# Patient Record
Sex: MALE | Race: WHITE | NOT HISPANIC OR LATINO | Employment: FULL TIME | URBAN - METROPOLITAN AREA
[De-identification: names, ages, dates, MRNs, and addresses within clinical notes are randomized per-mention and may not be internally consistent; named-entity substitution may affect disease eponyms.]

---

## 2019-03-29 ENCOUNTER — HOSPITAL ENCOUNTER (EMERGENCY)
Facility: MEDICAL CENTER | Age: 49
End: 2019-03-29
Attending: EMERGENCY MEDICINE
Payer: COMMERCIAL

## 2019-03-29 VITALS
SYSTOLIC BLOOD PRESSURE: 125 MMHG | TEMPERATURE: 97.7 F | BODY MASS INDEX: 29.95 KG/M2 | OXYGEN SATURATION: 97 % | DIASTOLIC BLOOD PRESSURE: 85 MMHG | WEIGHT: 246.03 LBS | RESPIRATION RATE: 18 BRPM | HEART RATE: 69 BPM

## 2019-03-29 DIAGNOSIS — S01.01XA LACERATION OF SCALP, INITIAL ENCOUNTER: ICD-10-CM

## 2019-03-29 PROCEDURE — 90715 TDAP VACCINE 7 YRS/> IM: CPT | Performed by: EMERGENCY MEDICINE

## 2019-03-29 PROCEDURE — 304999 HCHG REPAIR-SIMPLE/INTERMED LEVEL 1

## 2019-03-29 PROCEDURE — 304217 HCHG IRRIGATION SYSTEM

## 2019-03-29 PROCEDURE — 700101 HCHG RX REV CODE 250: Performed by: EMERGENCY MEDICINE

## 2019-03-29 PROCEDURE — 700111 HCHG RX REV CODE 636 W/ 250 OVERRIDE (IP): Performed by: EMERGENCY MEDICINE

## 2019-03-29 PROCEDURE — 303747 HCHG EXTRA SUTURE

## 2019-03-29 PROCEDURE — 99283 EMERGENCY DEPT VISIT LOW MDM: CPT

## 2019-03-29 PROCEDURE — 90471 IMMUNIZATION ADMIN: CPT

## 2019-03-29 RX ORDER — LIDOCAINE HYDROCHLORIDE 10 MG/ML
20 INJECTION, SOLUTION INFILTRATION; PERINEURAL ONCE
Status: COMPLETED | OUTPATIENT
Start: 2019-03-29 | End: 2019-03-29

## 2019-03-29 RX ADMIN — CLOSTRIDIUM TETANI TOXOID ANTIGEN (FORMALDEHYDE INACTIVATED), CORYNEBACTERIUM DIPHTHERIAE TOXOID ANTIGEN (FORMALDEHYDE INACTIVATED), BORDETELLA PERTUSSIS TOXOID ANTIGEN (GLUTARALDEHYDE INACTIVATED), BORDETELLA PERTUSSIS FILAMENTOUS HEMAGGLUTININ ANTIGEN (FORMALDEHYDE INACTIVATED), BORDETELLA PERTUSSIS PERTACTIN ANTIGEN, AND BORDETELLA PERTUSSIS FIMBRIAE 2/3 ANTIGEN 0.5 ML: 5; 2; 2.5; 5; 3; 5 INJECTION, SUSPENSION INTRAMUSCULAR at 17:02

## 2019-03-29 RX ADMIN — LIDOCAINE HYDROCHLORIDE 20 ML: 10 INJECTION, SOLUTION INFILTRATION; PERINEURAL at 16:48

## 2019-03-29 NOTE — LETTER
FORM C-4:  EMPLOYEE’S CLAIM FOR COMPENSATION/ REPORT OF INITIAL TREATMENT  EMPLOYEE’S CLAIM - PROVIDE ALL INFORMATION REQUESTED   First Name  Pj Last Name  Dedrick Birthdate             Age  1970 48 y.o. Sex  male Claim Number   Home Employee Address  [address] [place] [address]                                     Zip  [address] Height    Weight  111.6 kg (246 lb 0.5 oz) [facility]  [id number]   Mailing Employee Address                           [address] [place] [address]               Zip  [address] Telephone  [phone] (home)  Primary Language Spoken  ENGLISH   Insurer   Third Party   [employer] INSURANCE Employee's Occupation (Job Title) When Injury or Occupational Disease Occurred     Employer's Name   Telephone  [phone]    Employer Address  [address]] Zip  [address]   Date of Injury         Hour of Injury  2:00 PM Date Employer Notified  3/29/2019 Last Day of Work after Injury or Occupational Disease  3/29/2019 Supervisor to Whom Injury Reported  [place]   Address or Location of Accident (if applicable)  [[address] [place]]   What were you doing at the time of accident? (if applicable)  Standing Up    How did this injury or occupational disease occur? Be specific and answer in detail. Use additional sheet if necessary)  Was in the process of standing up and hit my head on the door to [employer] tank # 3   If you believe that you have an occupational disease, when did you first have knowledge of the disability and it relationship to your employment?  N/a Witnesses to the Accident  Morty H     Nature of Injury or Occupational Disease  Workers' Compensation  Part(s) of Body Injured or Affected  Skull, N/A, N/A    I certify that the above is true and correct to the best of my knowledge and that I have provided this information in order to obtain the benefits of [place] inclusive or [place] of [place]).  I hereby authorize any physician, chiropractor, surgeon, practitioner, or other person, any hospital, including [facility] or [facility] hospital, any medical service organization, any insurance company, or other institution or organization to release to each other, any medical or other information, including benefits paid or payable, pertinent to this injury or disease, except information relative to diagnosis, treatment and/or counseling for AIDS, psychological conditions, alcohol or controlled substances, for which I must give specific authorization.  A Photostat of this authorization shall be as valid as the original.   Date  03/29/2019 Place  [facility] Employee’s Signature   THIS REPORT MUST BE COMPLETED AND MAILED WITHIN 3 WORKING DAYS OF TREATMENT   Place  [facility], EMERGENCY DEPT  Name of Facility   [facility]   Date  3/29/2019 Diagnosis  (S01.01XA) Laceration of scalp, initial encounter Is there evidence the injured employee was under the influence of alcohol and/or another controlled substance at the time of accident?   Hour  5:18 PM Description of Injury or Disease  Laceration of scalp, initial encounter No   Treatment  Repair of laceration to scalp, irrigation  Have you advised the patient to remain off work five days or more?         No   X-Ray Findings      If Yes   From Date    To Date      From information given by the employee, together with medical evidence, can you directly connect this injury or occupational disease as job incurred?  Yes If No, is the employee capable of: Full Duty  Yes Modified Duty      Is additional medical care by a physician indicated?  Yes If Modified Duty, Specify any Limitations / Restrictions        Do you know of any previous injury or disease contributing to this condition or occupational disease?  No   Date  3/29/2019 Print Doctor’s Name  Avery Mistry I certify the  "employer’s copy of this form was mailed on:   Address  [address]  [place] [address] [phone] Insurer’s Use Only   [facility]  [address]    Provider’s Tax ID Number  [id number] Telephone  Dept: [phone]    Doctor’s Signature  nadira-CINDY Hlal.ONayana Degree   DO    Original - TREATING PHYSICIAN OR CHIROPRACTOR   Pg 2-Insurer/TPA   Pg 3-Employer   Pg 4-Employee                                                                                                  Form C-4 (rev01/03)   BRIEF DESCRIPTION OF RIGHTS AND BENEFITS  (Pursuant to [place]RS 616C.050)  Notice of Injury or Occupational Disease (Incident Report Form C-1): If an injury or occupational disease (OD) arises out of and in the course of employment, you must provide written notice to your employer as soon as practicable, but no later than 7 days after the accident or OD. Your employer shall maintain a sufficient supply of the required forms.    Claim for Compensation (Form C-4): If medical treatment is sought, the form C-4 is available at the place of initial treatment. A completed \"Claim for Compensation\" (Form C-4) must be filed within 90 days after an accident or OD. The treating physician or chiropractor must, within 3 working days after treatment, complete and mail to the employer, the employer's insurer and third-party , the Claim for Compensation.    Medical Treatment: If you require medical treatment for your on-the-job injury or OD, you may be required to select a physician or chiropractor from a list provided by your workers’ compensation insurer, if it has contracted with an Organization for Managed Care (MCO) or Preferred Provider Organization (PPO) or providers of health care. If your employer has not entered into a contract with an MCO or PPO, you may select a physician or chiropractor from the Panel of Physicians and Chiropractors. Any medical costs related to your industrial injury or OD will be " paid by your insurer.    Temporary Total Disability (TTD): If your doctor has certified that you are unable to work for a period of at least 5 consecutive days, or 5 cumulative days in a 20-day period, or places restrictions on you that your employer does not accommodate, you may be entitled to TTD compensation.    Temporary Partial Disability (TPD): If the wage you receive upon reemployment is less than the compensation for TTD to which you are entitled, the insurer may be required to pay you TPD compensation to make up the difference. TPD can only be paid for a maximum of 24 months.    Permanent Partial Disability (PPD): When your medical condition is stable and there is an indication of a PPD as a result of your injury or OD, within 30 days, your insurer must arrange for an evaluation by a rating physician or chiropractor to determine the degree of your PPD. The amount of your PPD award depends on the date of injury, the results of the PPD evaluation and your age and wage.    Permanent Total Disability (PTD): If you are medically certified by a treating physician or chiropractor as permanently and totally disabled and have been granted a PTD status by your insurer, you are entitled to receive monthly benefits not to exceed 66 2/3% of your average monthly wage. The amount of your PTD payments is subject to reduction if you previously received a PPD award.    Vocational Rehabilitation Services: You may be eligible for vocational rehabilitation services if you are unable to return to the job due to a permanent physical impairment or permanent restrictions as a result of your injury or occupational disease.    Transportation and Per Mendel Reimbursement: You may be eligible for travel expenses and per mendel associated with medical treatment.  Reopening: You may be able to reopen your claim if your condition worsens after claim closure.    Appeal Process: If you disagree with a written determination issued by the insurer  or the insurer does not respond to your request, you may appeal to the Department of Administration, , by following the instructions contained in your determination letter. You must appeal the determination within 70 days from the date of the determination letter at [address], [address], [place], [place] [address], or [address], [address], [place], [place] [address]. If you disagree with the  decision, you may appeal to the Department of Administration, . You must file your appeal within 30 days from the date of the  decision letter at [address], [address], [place], [place] [address], or [address], [address], [place], [place] [address]. If you disagree with a decision of an , you may file a petition for judicial review with the District Court. You must do so within 30 days of the Appeal Officer’s decision. You may be represented by an  at your own expense or you may contact the [facility] for possible representation.    [place]  for Injured Workers (NAIW): If you disagree with a  decision, you may request that NAIW represent you without charge at an  Hearing. For information regarding denial of benefits, you may contact the [facility] at: [address], [address], [place] [address], [phone], or [address], [address], [place] [address], [phone]    To File a Complaint with the Division: If you wish to file a complaint with the  of the Division of Industrial Relations (DIR), please contact the Workers’ Compensation Section, [address], [address], [place], [place] [address], telephone [phone]) [phone], or [address], [place], [place] [address], telephone [phone].    For assistance with Workers’ Compensation Issues: you may contact the Office of the Governor Consumer Health  Assistance, [address], [address], [place], [place] [address], Toll Free 1-880.828.9790, Web site: [email or web address]., E-mail [email or web address].                                                                                                                                                                             __________________________________________________________________                                    _________________            Employee Name / Signature                                                                                                                            Date                                       D-2 (rev. 10/07)

## 2019-03-29 NOTE — ED TRIAGE NOTES
"Chief Complaint   Patient presents with   • Head Injury     hit head on tank, laceration top head     Pt ambulated to triage, here after hitting head on \" bright tank\" while at work, laceration to head, bleeding stopped. Denies loc. nad.  "

## 2019-03-30 NOTE — ED PROVIDER NOTES
ED Provider Note    CHIEF COMPLAINT   Chief Complaint   Patient presents with   • Head Injury     hit head on tank, laceration top head       HPI   Pj Tse is a 48 y.o. male who presents to emerge from today with laceration to the scalp.  Patient was at work and hit his head on tank causing laceration to the scalp approximately 2.5 cm.  He had no loss of consciousness and bleeding is controlled at this time he has some localized pain to the area he had irrigated it at work and presented to the emergency room.    REVIEW OF SYSTEMS   See HPI for further details. All other systems are negative.     PAST MEDICAL HISTORY   No past medical history on file.    FAMILY HISTORY  Family History   Problem Relation Age of Onset   • Diabetes Maternal Aunt        SOCIAL HISTORY  Social History     Social History   • Marital status: Single     Spouse name: N/A   • Number of children: N/A   • Years of education: N/A     Social History Main Topics   • Smoking status: Former Smoker     Packs/day: 0.30     Types: Cigarettes     Quit date: 1/1/2003   • Smokeless tobacco: Never Used   • Alcohol use Yes      Comment: occasional   • Drug use: No   • Sexual activity: Not Currently     Partners: Female, Male     Other Topics Concern   • Not on file     Social History Narrative   • No narrative on file        SURGICAL HISTORY  No past surgical history on file.    CURRENT MEDICATIONS   Home Medications     Reviewed by Leticia Junior R.N. (Registered Nurse) on 03/29/19 at 1617  Med List Status: Complete   Medication Last Dose Status        Patient Alex Taking any Medications                       ALLERGIES   No Known Allergies    PHYSICAL EXAM  VITAL SIGNS: /73   Pulse 77   Temp 36.3 °C (97.4 °F)   Resp 16   Wt 111.6 kg (246 lb 0.5 oz)   SpO2 98%   BMI 29.95 kg/m²       Constitutional: Well developed, Well nourished, No acute distress, Non-toxic appearance.   Cardiovascular: Normal heart rate, Normal rhythm, No murmurs, No  rubs, No gallops.   Thorax & Lungs: Normal breath sounds, No respiratory distress, No wheezing, No chest tenderness.   Skin: 2.5 cm laceration of the scalp with depth to the subcutaneous tissue there is no active bleeding at this time no evidence of foreign body.  Extremities: Intact distal pulses, No edema, No tenderness, No cyanosis, No clubbing.       COURSE & MEDICAL DECISION MAKING  Pertinent Labs & Imaging studies reviewed. (See chart for details) patient was made up-to-date on tetanus the see for Worker's Comp. she was filled out sutures out in 7 days signs symptoms of infection if there is redness, swelling, discharge or fever return promptly to the emergency room.  Verbalizes understand instructions need for follow-up.        PROCEDURE NOTE; repair of laceration to the scalp by ER physician 2.5 cm; patient injected and metastasized locally with Xylocaine 1% solution without epinephrine.  Patient was irrigated with copious muscle sterile saline solution by ER physician approximately 300 cc there is no evidence of foreign body.  Using sterile technique and under sterile conditions sutures were applied with 5 x 0 nylon thread for total of 4 interrupted sutures there is good closure the wound patient tolerated procedure well bacitracin dressing applied.    FINAL IMPRESSION  1.  Acute 2.5 cm laceration to the scalp  2.   3.      Electronically signed by: Avery Mistry, 3/29/2019 5:31 PM

## 2019-03-30 NOTE — ED NOTES
Pt given discharge instructions. Signed copy in chart. Pt states all belongings in possession. Pt ambulatory off unit with steady gait.

## 2019-04-05 ENCOUNTER — OCCUPATIONAL MEDICINE (OUTPATIENT)
Dept: URGENT CARE | Facility: CLINIC | Age: 49
End: 2019-04-05
Payer: COMMERCIAL

## 2019-04-05 VITALS
WEIGHT: 246 LBS | OXYGEN SATURATION: 99 % | HEIGHT: 76 IN | HEART RATE: 83 BPM | RESPIRATION RATE: 16 BRPM | TEMPERATURE: 98.3 F | BODY MASS INDEX: 29.96 KG/M2 | DIASTOLIC BLOOD PRESSURE: 62 MMHG | SYSTOLIC BLOOD PRESSURE: 110 MMHG

## 2019-04-05 DIAGNOSIS — Z48.02 ENCOUNTER FOR REMOVAL OF SUTURES: ICD-10-CM

## 2019-04-05 PROCEDURE — 99213 OFFICE O/P EST LOW 20 MIN: CPT | Mod: 29 | Performed by: PHYSICIAN ASSISTANT

## 2019-04-05 RX ORDER — METHOCARBAMOL 500 MG/1
1000 TABLET, FILM COATED ORAL 4 TIMES DAILY
COMMUNITY

## 2019-04-05 NOTE — PATIENT INSTRUCTIONS
Suture Removal, Care After  Refer to this sheet in the next few weeks. These instructions provide you with information on caring for yourself after your procedure. Your health care provider may also give you more specific instructions. Your treatment has been planned according to current medical practices, but problems sometimes occur. Call your health care provider if you have any problems or questions after your procedure.  WHAT TO EXPECT AFTER THE PROCEDURE  After your stitches (sutures) are removed, it is typical to have the following:  · Some discomfort and swelling in the wound area.  · Slight redness in the area.  HOME CARE INSTRUCTIONS   · If you have skin adhesive strips over the wound area, do not take the strips off. They will fall off on their own in a few days. If the strips remain in place after 14 days, you may remove them.  · Change any bandages (dressings) at least once a day or as directed by your health care provider. If the bandage sticks, soak it off with warm, soapy water.  · Apply cream or ointment only as directed by your health care provider. If using cream or ointment, wash the area with soap and water 2 times a day to remove all the cream or ointment. Rinse off the soap and pat the area dry with a clean towel.  · Keep the wound area dry and clean. If the bandage becomes wet or dirty, or if it develops a bad smell, change it as soon as possible.  · Continue to protect the wound from injury.  · Use sunscreen when out in the sun. New scars become sunburned easily.  SEEK MEDICAL CARE IF:  · You have increasing redness, swelling, or pain in the wound.  · You see pus coming from the wound.  · You have a fever.  · You notice a bad smell coming from the wound or dressing.  · Your wound breaks open (edges not staying together).     This information is not intended to replace advice given to you by your health care provider. Make sure you discuss any questions you have with your health care  provider.     Document Released: 09/12/2002 Document Revised: 10/08/2014 Document Reviewed: 07/30/2014  [employer] Interactive Patient Education ©2016 [employer]

## 2019-04-05 NOTE — LETTER
[facility] [address] [place] [address]  Phone:  [phone] - Fax:  [phone]   [facility] Progress Report and Disability Certification  Date of Service: 4/5/2019   No Show:  No  Date / Time of Next Visit:  MMI   Claim Information   Patient Name: Pj Tse  Claim Number:     Employer:   [employer] Date of Injury: 3/29/2019     Insurer / TPA: [employer]  ID / SSN:     Occupation: Packaging  Diagnosis: The encounter diagnosis was Encounter for removal of sutures.    Medical Information   Related to Industrial Injury? Yes    Subjective Complaints:  DOI: 3/29/2019. Patient presents clinic today for removal of sutures from scalp.  Patient states he was injured at work, he came up underneath a tank and got a laceration to his scalp.  Patient states he has not had any issues with the wound.  It is healed well.   Objective Findings: Well-healed 2.5 cm laceration to scalp right front.  No tenderness, no erythema, no discharge from laceration.   Pre-Existing Condition(s):     Assessment:   Condition Improved    Status: Discharged /  MMI  Permanent Disability:No    Plan:      Diagnostics:      Comments:       Disability Information   Status: Released to Full Duty    From:     Through:   Restrictions are:     Physical Restrictions   Sitting:    Standing:    Stooping:    Bending:      Squatting:    Walking:    Climbing:    Pushing:      Pulling:    Other:    Reaching Above Shoulder (L):   Reaching Above Shoulder (R):       Reaching Below Shoulder (L):    Reaching Below Shoulder (R):      Not to exceed Weight Limits   Carrying(hrs):   Weight Limit(lb):   Lifting(hrs):   Weight  Limit(lb):     Comments: 4 sutures removed.  Wound care information given to patient.  Patient is discharged MMI.    Repetitive Actions   Hands: i.e. Fine Manipulations from Grasping:     Feet: i.e. Operating Foot Controls:     Driving / Operate Machinery:      Physician Name: Marietta Godoy P.A.-C. Physician Signature: MARIETTA Epps P.A.-C. e-Signature: Dr. Peter Tapia, Medical Director   Clinic Name / Location: [address] [address] Clinic Phone Number: Dept: [phone]   Appointment Time: 4:00 Pm Visit Start Time: 4:39 PM   Check-In Time:  3:55 Pm Visit Discharge Time:  5:07 PM   Original-Treating Physician or Chiropractor    Page 2-Insurer/TPA    Page 3-Employer    Page 4-Employee

## 2019-04-06 NOTE — PROGRESS NOTES
"Subjective:      Pj Tse is a 48 y.o. male who presents with Work-Related Injury (W/C FV DOI 3/29/19, 4 sutures in forehead)    Pt PMH, SocHx, SurgHx, FamHx, Drug allergies and medications reviewed with pt/EPIC.      Family history reviewed, it is not pertinent to this complaint.     DOI: 3/29/2019. Patient presents clinic today for removal of sutures from scalp.  Patient states he was injured at work, he came up underneath a tank and got a laceration to his scalp.  Patient states he has not had any issues with the wound.  It is healed well.     Work comp follow up appointment for suture removal.         ROS       Objective:     /62   Pulse 83   Temp 36.8 °C (98.3 °F) (Temporal)   Resp 16   Ht 1.93 m (6' 4\")   Wt 111.6 kg (246 lb)   SpO2 99%   BMI 29.94 kg/m²      Physical Exam    Well-healed 2.5 cm laceration to scalp right front.  No tenderness, no erythema, no discharge from laceration.    4 sutures removed without complication.      Assessment/Plan:     1. Encounter for removal of sutures       PT has reached MMI and has been discharged to return to work without restrictions.     "

## 2020-12-31 ENCOUNTER — NON-PROVIDER VISIT (OUTPATIENT)
Dept: URGENT CARE | Facility: PHYSICIAN GROUP | Age: 50
End: 2020-12-31

## 2020-12-31 DIAGNOSIS — Z02.1 PRE-EMPLOYMENT DRUG SCREENING: ICD-10-CM

## 2020-12-31 LAB
AMP AMPHETAMINE: NORMAL
COC COCAINE: NORMAL
INT CON NEG: NORMAL
INT CON POS: NORMAL
MET METHAMPHETAMINES: NORMAL
OPI OPIATES: NORMAL
PCP PHENCYCLIDINE: NORMAL
POC DRUG COMMENT 753798-OCCUPATIONAL HEALTH: NORMAL
THC: NORMAL

## 2020-12-31 PROCEDURE — 80305 DRUG TEST PRSMV DIR OPT OBS: CPT | Performed by: NURSE PRACTITIONER
